# Patient Record
Sex: MALE | Race: WHITE
[De-identification: names, ages, dates, MRNs, and addresses within clinical notes are randomized per-mention and may not be internally consistent; named-entity substitution may affect disease eponyms.]

---

## 2017-01-12 ENCOUNTER — HOSPITAL ENCOUNTER (OUTPATIENT)
Dept: HOSPITAL 80 - BMCIMAGING | Age: 77
End: 2017-01-12
Attending: INTERNAL MEDICINE
Payer: COMMERCIAL

## 2017-01-12 DIAGNOSIS — I65.23: Primary | ICD-10-CM

## 2017-01-12 NOTE — US
Bilateral Duplex/Doppler Carotid Sonography 

 

Clinical Indications: 76-year-old male a history of TIA, a value a for arterial occlusive disease.

 

Technique: The cervical portions of the carotid and vertebral arteries were imaged and interrogated b
y color and pulsed Doppler.  Spectral analysis was performed. Comparison to the previous examination 
December 29, 2015.

 

Findings:

 

Right Carotid: The common carotid artery, bifurcation, and origins of the internal and external carot
id arteries are well imaged.  Mild plaque formation is seen at the level of the carotid bifurcation.

 

Peak ICA systolic velocity;  69 cm/sec.

The internal to common carotid artery ratio is calculated at 1.0.

Peak ICA diastolic velocity; 18 cm/sec.

There is no evidence of flow-limiting stenosis.

 

Left Carotid:  The common carotid artery, bifurcation, and origins of the internal and external carot
id arteries are well imaged.

Mild plaque formation is seen at the level of the carotid bifurcation. 

 

Peak ICA systolic velocity; 54 cm/sec.

The internal to common carotid artery ratio is calculated at 0.7. 

Peak ICA diastolic velocity; 18 cm/sec.

There is no evidence of flow-limiting stenosis.

 

Vertebral Arteries:  Antegrade flow is shown by pulsed Duplex/Doppler of each vertebral artery.

 

There has been no significant change from the prior Doppler examination. Incidentally cardiac arrhyth
mias are seen.

 

Impression: Plaque formation is seen involving the carotid bifurcations bilaterally with no evidence 
of flow-limiting carotid stenosis.

 

Measurement of carotid stenosis is based on velocity parameters that correlate the residual internal 
carotid diameter with [place] Symptomatic Carotid Endarterectomy Trial (NASCET) based stenosis 
levels.

## 2018-06-04 ENCOUNTER — HOSPITAL ENCOUNTER (OUTPATIENT)
Dept: HOSPITAL 80 - FIMAGING | Age: 78
End: 2018-06-04
Attending: FAMILY MEDICINE
Payer: COMMERCIAL

## 2018-06-04 DIAGNOSIS — Z13.89: Primary | ICD-10-CM

## 2018-06-04 PROCEDURE — 74170 CT ABD WO CNTRST FLWD CNTRST: CPT

## 2018-06-14 ENCOUNTER — HOSPITAL ENCOUNTER (OUTPATIENT)
Dept: HOSPITAL 80 - BHFA | Age: 78
End: 2018-06-14
Attending: INTERNAL MEDICINE
Payer: COMMERCIAL

## 2018-06-14 DIAGNOSIS — E78.5: ICD-10-CM

## 2018-06-14 DIAGNOSIS — Q21.1: Primary | ICD-10-CM
